# Patient Record
Sex: MALE | Race: WHITE | Employment: FULL TIME | URBAN - NONMETROPOLITAN AREA
[De-identification: names, ages, dates, MRNs, and addresses within clinical notes are randomized per-mention and may not be internally consistent; named-entity substitution may affect disease eponyms.]

---

## 2017-04-18 ENCOUNTER — OFFICE VISIT (OUTPATIENT)
Dept: FAMILY MEDICINE CLINIC | Age: 22
End: 2017-04-18

## 2017-04-18 VITALS
DIASTOLIC BLOOD PRESSURE: 78 MMHG | HEIGHT: 73 IN | WEIGHT: 293.2 LBS | BODY MASS INDEX: 38.86 KG/M2 | SYSTOLIC BLOOD PRESSURE: 138 MMHG | HEART RATE: 72 BPM

## 2017-04-18 DIAGNOSIS — S43.402A SPRAIN SHOULDER/ARM, LEFT, INITIAL ENCOUNTER: Primary | ICD-10-CM

## 2017-04-18 PROCEDURE — 99213 OFFICE O/P EST LOW 20 MIN: CPT | Performed by: FAMILY MEDICINE

## 2017-04-18 RX ORDER — NAPROXEN SODIUM 220 MG
220 TABLET ORAL PRN
COMMUNITY
End: 2017-04-18

## 2017-04-18 RX ORDER — TRAMADOL HYDROCHLORIDE 50 MG/1
50 TABLET ORAL EVERY 6 HOURS PRN
Qty: 20 TABLET | Refills: 0 | Status: SHIPPED | OUTPATIENT
Start: 2017-04-18 | End: 2017-04-28

## 2017-04-18 RX ORDER — PREDNISONE 10 MG/1
10 TABLET ORAL 2 TIMES DAILY
Qty: 14 TABLET | Refills: 0 | Status: SHIPPED | OUTPATIENT
Start: 2017-04-18 | End: 2017-04-25

## 2017-06-01 ENCOUNTER — OFFICE VISIT (OUTPATIENT)
Dept: FAMILY MEDICINE CLINIC | Age: 22
End: 2017-06-01

## 2017-06-01 VITALS
BODY MASS INDEX: 37.36 KG/M2 | WEIGHT: 283.2 LBS | SYSTOLIC BLOOD PRESSURE: 138 MMHG | HEART RATE: 86 BPM | DIASTOLIC BLOOD PRESSURE: 82 MMHG

## 2017-06-01 DIAGNOSIS — S93.401A SPRAIN OF RIGHT ANKLE, UNSPECIFIED LIGAMENT, INITIAL ENCOUNTER: Primary | ICD-10-CM

## 2017-06-01 PROCEDURE — 99213 OFFICE O/P EST LOW 20 MIN: CPT | Performed by: FAMILY MEDICINE

## 2017-10-03 ENCOUNTER — OFFICE VISIT (OUTPATIENT)
Dept: FAMILY MEDICINE CLINIC | Age: 22
End: 2017-10-03
Payer: COMMERCIAL

## 2017-10-03 VITALS
BODY MASS INDEX: 36.9 KG/M2 | HEIGHT: 73 IN | HEART RATE: 88 BPM | WEIGHT: 278.4 LBS | SYSTOLIC BLOOD PRESSURE: 138 MMHG | DIASTOLIC BLOOD PRESSURE: 80 MMHG

## 2017-10-03 DIAGNOSIS — F42.2 MIXED OBSESSIONAL THOUGHTS AND ACTS: Chronic | ICD-10-CM

## 2017-10-03 PROCEDURE — 99213 OFFICE O/P EST LOW 20 MIN: CPT | Performed by: FAMILY MEDICINE

## 2017-10-03 RX ORDER — FLUOXETINE HYDROCHLORIDE 20 MG/1
20 CAPSULE ORAL DAILY
Qty: 30 CAPSULE | Refills: 1 | Status: SHIPPED | OUTPATIENT
Start: 2017-10-03 | End: 2017-12-08 | Stop reason: SDUPTHER

## 2017-10-03 NOTE — PROGRESS NOTES
Name:  Geo Mcdowell  :    1995    Chief Complaint   Patient presents with    Anxiety     checkup       HPI:  20 minute visit to consider treatment for OCD. Has strong FHx. Has done a lot of study and review. Tries to control his over thinking. Some disability. Affect job and relationship. Concerned about people judging him. Very productive. No alcohol. Low Weed. Physical Exam:      /80 (Site: Right Arm, Position: Sitting, Cuff Size: Large Adult)  Pulse 88  Ht 6' 1\" (1.854 m)  Wt 278 lb 6.4 oz (126.3 kg)  BMI 36.73 kg/m2    Neuro is normal.  Psych shows some anxiety but no depression. Some insight. Not ready to fulling accept issue. Interactive. He can get down on himself. Assessment/Plan:      OCD    Education. Encouragement. Start Prozac. Betina Phillip was seen today for anxiety. Diagnoses and all orders for this visit:    Mixed obsessional thoughts and acts  -     FLUoxetine (PROZAC) 20 MG capsule;  Take 1 capsule by mouth daily

## 2017-10-03 NOTE — MR AVS SNAPSHOT
· Keep a record of your symptoms. Discuss your fears with a good friend or family member, or join a support group for people with similar problems. Talking to others sometimes relieves stress. · Get involved in social groups, or volunteer to help others. Being alone sometimes makes things seem worse than they are. · Get at least 30 minutes of exercise on most days of the week to relieve stress. Walking is a good choice. You also may want to do other activities, such as running, swimming, cycling, or playing tennis or team sports. Relaxation techniques  Do relaxation exercises 10 to 20 minutes a day. You can play soothing, relaxing music while you do them, if you wish. · Tell others in your house that you are going to do your relaxation exercises. Ask them not to disturb you. · Find a comfortable place, away from all distractions and noise. · Lie down on your back, or sit with your back straight. · Focus on your breathing. Make it slow and steady. · Breathe in through your nose. Breathe out through either your nose or mouth. · Breathe deeply, filling up the area between your navel and your rib cage. Breathe so that your belly goes up and down. · Do not hold your breath. · Breathe like this for 5 to 10 minutes. Notice the feeling of calmness throughout your whole body. As you continue to breathe slowly and deeply, relax by doing the following for another 5 to 10 minutes:  · Tighten and relax each muscle group in your body. You can begin at your toes and work your way up to your head. · Imagine your muscle groups relaxing and becoming heavy. · Empty your mind of all thoughts. · Let yourself relax more and more deeply. · Become aware of the state of calmness that surrounds you. · When your relaxation time is over, you can bring yourself back to alertness by moving your fingers and toes and then your hands and feet and then stretching and moving your entire body.  Sometimes people fall asleep during relaxation, but they usually wake up shortly afterward. · Always give yourself time to return to full alertness before you drive a car or do anything that might cause an accident if you are not fully alert. Never play a relaxation tape while you drive a car. When should you call for help? Call 911 anytime you think you may need emergency care. For example, call if:  · You feel you cannot stop from hurting yourself or someone else. Keep the numbers for these national suicide hotlines: 0-466-646-TALK (6-872.628.2772) and 2-502-TKMPHZZ (3-945.167.8533). If you or someone you know talks about suicide or feeling hopeless, get help right away. Watch closely for changes in your health, and be sure to contact your doctor if:  · You have anxiety or fear that affects your life. · You have symptoms of anxiety that are new or different from those you had before. Where can you learn more? Go to https://[employer]. org and sign in to your [employer] account. Enter P754 in the [employer] box to learn more about \"Anxiety Disorder: Care Instructions. \"     If you do not have an account, please click on the \"Sign Up Now\" link. Current as of: July 26, 2016  Content Version: 11.3  © 2166-1901 [employer]. Care instructions adapted under license by [facility] ([facility]). If you have questions about a medical condition or this instruction, always ask your healthcare professional. [place] [address] any warranty or liability for your use of this information. Today's Medication Changes          These changes are accurate as of: 10/3/17  3:46 PM.  If you have any questions, ask your nurse or doctor. START taking these medications           FLUoxetine 20 MG capsule   Commonly known as:  PROZAC   Instructions:   Take 1 capsule by mouth daily   Quantity:  30 capsule   Refills:  1   Started by:  Anahi Landrum MD            Where to Get Your Medications These medications were sent to [address] [place] [address], [address]  [address]     Phone:  [phone]     FLUoxetine 20 MG capsule               Your Current Medications Are              FLUoxetine (PROZAC) 20 MG capsule Take 1 capsule by mouth daily      Allergies           No Known Allergies         Additional Information        Basic Information     Date Of Birth Sex Race Ethnicity Preferred Language    1995 Male White Non-/Non  English      Problem List as of 10/3/2017  Date Reviewed: 10/30/2015                Mixed obsessional thoughts and acts (Chronic)      Your Goals as of 10/3/2017 at 3:46 PM                 Exercise    Exercise 3x per week (30 min per time)       Immunizations as of 10/3/2017     Name Date    Tdap (Boostrix, Adacel) 6/24/2014      Preventive Care        Date Due    HIV screening is recommended for all people regardless of risk factors  aged 15-65 years at least once (lifetime) who have never been HIV tested. 8/26/2008    Pneumococcal Vaccine - Pneumovax for adults aged 19-64 years with: chronic heart disease, chronic lung disease, diabetes mellitus, alcoholism, chronic liver disease, or cigarette smoking. (1 of 1 - PPSV23) 8/26/2012    Yearly Flu Vaccine (1) 9/1/2017    Tetanus Combination Vaccine (2 - Td) 6/24/2024            MyChart Signup           Our records indicate that you have declined MyChart signup.

## 2017-10-03 NOTE — PATIENT INSTRUCTIONS
Anxiety Disorder: Care Instructions  Your Care Instructions  Anxiety is a normal reaction to stress. Difficult situations can cause you to have symptoms such as sweaty palms and a nervous feeling. In an anxiety disorder, the symptoms are far more severe. Constant worry, muscle tension, trouble sleeping, nausea and diarrhea, and other symptoms can make normal daily activities difficult or impossible. These symptoms may occur for no reason, and they can affect your work, school, or social life. Medicines, counseling, and self-care can all help. Follow-up care is a key part of your treatment and safety. Be sure to make and go to all appointments, and call your doctor if you are having problems. It's also a good idea to know your test results and keep a list of the medicines you take. How can you care for yourself at home? · Take medicines exactly as directed. Call your doctor if you think you are having a problem with your medicine. · Go to your counseling sessions and follow-up appointments. · Recognize and accept your anxiety. Then, when you are in a situation that makes you anxious, say to yourself, \"This is not an emergency. I feel uncomfortable, but I am not in danger. I can keep going even if I feel anxious. \"  · Be kind to your body:  ¨ Relieve tension with exercise or a massage. ¨ Get enough rest.  ¨ Avoid alcohol, caffeine, nicotine, and illegal drugs. They can increase your anxiety level and cause sleep problems. ¨ Learn and do relaxation techniques. See below for more about these techniques. · Engage your mind. Get out and do something you enjoy. Go to a funny movie, or take a walk or hike. Plan your day. Having too much or too little to do can make you anxious. · Keep a record of your symptoms. Discuss your fears with a good friend or family member, or join a support group for people with similar problems. Talking to others sometimes relieves stress.   · Get involved in social groups, or

## 2017-12-08 ENCOUNTER — OFFICE VISIT (OUTPATIENT)
Dept: FAMILY MEDICINE CLINIC | Age: 22
End: 2017-12-08
Payer: COMMERCIAL

## 2017-12-08 VITALS
DIASTOLIC BLOOD PRESSURE: 70 MMHG | HEIGHT: 73 IN | BODY MASS INDEX: 36.71 KG/M2 | HEART RATE: 80 BPM | SYSTOLIC BLOOD PRESSURE: 108 MMHG | WEIGHT: 277 LBS

## 2017-12-08 DIAGNOSIS — F42.2 MIXED OBSESSIONAL THOUGHTS AND ACTS: Chronic | ICD-10-CM

## 2017-12-08 PROCEDURE — 99213 OFFICE O/P EST LOW 20 MIN: CPT | Performed by: FAMILY MEDICINE

## 2017-12-08 RX ORDER — FLUOXETINE HYDROCHLORIDE 20 MG/1
20 CAPSULE ORAL DAILY
Qty: 90 CAPSULE | Refills: 1 | Status: SHIPPED | OUTPATIENT
Start: 2017-12-08 | End: 2018-08-23 | Stop reason: SDUPTHER

## 2018-04-13 ENCOUNTER — OFFICE VISIT (OUTPATIENT)
Dept: FAMILY MEDICINE CLINIC | Age: 23
End: 2018-04-13
Payer: COMMERCIAL

## 2018-04-13 VITALS
DIASTOLIC BLOOD PRESSURE: 74 MMHG | SYSTOLIC BLOOD PRESSURE: 128 MMHG | WEIGHT: 278.8 LBS | HEIGHT: 73 IN | BODY MASS INDEX: 36.95 KG/M2 | HEART RATE: 68 BPM

## 2018-04-13 DIAGNOSIS — F42.2 MIXED OBSESSIONAL THOUGHTS AND ACTS: Primary | ICD-10-CM

## 2018-04-13 PROCEDURE — 99213 OFFICE O/P EST LOW 20 MIN: CPT | Performed by: FAMILY MEDICINE

## 2018-04-13 ASSESSMENT — PATIENT HEALTH QUESTIONNAIRE - PHQ9
1. LITTLE INTEREST OR PLEASURE IN DOING THINGS: 1
SUM OF ALL RESPONSES TO PHQ9 QUESTIONS 1 & 2: 2
2. FEELING DOWN, DEPRESSED OR HOPELESS: 1
SUM OF ALL RESPONSES TO PHQ QUESTIONS 1-9: 2

## 2018-08-23 ENCOUNTER — OFFICE VISIT (OUTPATIENT)
Dept: FAMILY MEDICINE CLINIC | Age: 23
End: 2018-08-23
Payer: COMMERCIAL

## 2018-08-23 VITALS
BODY MASS INDEX: 36.02 KG/M2 | SYSTOLIC BLOOD PRESSURE: 128 MMHG | DIASTOLIC BLOOD PRESSURE: 60 MMHG | WEIGHT: 273 LBS | HEART RATE: 76 BPM

## 2018-08-23 DIAGNOSIS — F42.2 MIXED OBSESSIONAL THOUGHTS AND ACTS: Chronic | ICD-10-CM

## 2018-08-23 PROCEDURE — 99213 OFFICE O/P EST LOW 20 MIN: CPT | Performed by: FAMILY MEDICINE

## 2018-08-23 RX ORDER — OMEPRAZOLE 40 MG/1
40 CAPSULE, DELAYED RELEASE ORAL DAILY
Qty: 90 CAPSULE | Refills: 1 | Status: SHIPPED | OUTPATIENT
Start: 2018-08-23 | End: 2020-12-16

## 2018-08-23 RX ORDER — FLUOXETINE HYDROCHLORIDE 20 MG/1
20 CAPSULE ORAL DAILY
Qty: 90 CAPSULE | Refills: 1 | Status: SHIPPED | OUTPATIENT
Start: 2018-08-23 | End: 2019-01-28 | Stop reason: CLARIF

## 2019-01-28 ENCOUNTER — OFFICE VISIT (OUTPATIENT)
Dept: FAMILY MEDICINE CLINIC | Age: 24
End: 2019-01-28
Payer: COMMERCIAL

## 2019-01-28 VITALS
SYSTOLIC BLOOD PRESSURE: 130 MMHG | BODY MASS INDEX: 38.67 KG/M2 | HEART RATE: 96 BPM | HEIGHT: 73 IN | DIASTOLIC BLOOD PRESSURE: 70 MMHG | WEIGHT: 291.8 LBS

## 2019-01-28 DIAGNOSIS — L30.9 DERMATITIS: Primary | ICD-10-CM

## 2019-01-28 PROCEDURE — 99213 OFFICE O/P EST LOW 20 MIN: CPT | Performed by: FAMILY MEDICINE

## 2020-12-16 ENCOUNTER — VIRTUAL VISIT (OUTPATIENT)
Dept: FAMILY MEDICINE CLINIC | Age: 25
End: 2020-12-16
Payer: COMMERCIAL

## 2020-12-16 PROCEDURE — 99213 OFFICE O/P EST LOW 20 MIN: CPT | Performed by: FAMILY MEDICINE

## 2020-12-16 PROCEDURE — G8427 DOCREV CUR MEDS BY ELIG CLIN: HCPCS | Performed by: FAMILY MEDICINE

## 2020-12-16 ASSESSMENT — ENCOUNTER SYMPTOMS
WHEEZING: 0
SORE THROAT: 0
DIARRHEA: 0
RHINORRHEA: 1
SHORTNESS OF BREATH: 0
COUGH: 1

## 2020-12-16 ASSESSMENT — PATIENT HEALTH QUESTIONNAIRE - PHQ9
SUM OF ALL RESPONSES TO PHQ9 QUESTIONS 1 & 2: 0
SUM OF ALL RESPONSES TO PHQ QUESTIONS 1-9: 0
SUM OF ALL RESPONSES TO PHQ QUESTIONS 1-9: 0
1. LITTLE INTEREST OR PLEASURE IN DOING THINGS: 0
SUM OF ALL RESPONSES TO PHQ QUESTIONS 1-9: 0
2. FEELING DOWN, DEPRESSED OR HOPELESS: 0

## 2020-12-16 NOTE — LETTER
[address] Medicine  [address] [facility]  Phone: [phone]  Fax: [phone]    Lily Riley MD        December 16, 2020     Patient: Pipe Roberts   YOB: 1995   Date of Visit: 12/16/2020       To Whom It May Concern: It is my medical opinion that Saima Castillo should not work today or tomorrow as he is being tested for covid. May not return to work until covid test is resulted and negative. If you have any questions or concerns, please don't hesitate to call.     Sincerely,          Lily Riley MD

## 2020-12-16 NOTE — PROGRESS NOTES
2020    TELEHEALTH EVALUATION -- Audio/Visual (During [facility]- public health emergency)    HPI:    Monserrat Wolfe (:  1995) has requested an audio/video evaluation for the following concern(s):    Tiredness:   Has body aches. Loss of taste  Started yesterday. Started yesterday    Roommate tested positive for covid yesterday. No other known covid exposure. Works at [employer] and son    Review of Systems   Constitutional: Positive for fatigue and fever. Negative for chills. HENT: Positive for rhinorrhea. Negative for congestion and sore throat. Respiratory: Positive for cough. Negative for shortness of breath and wheezing. Gastrointestinal: Negative for diarrhea. Neurological: Negative for dizziness and headaches. Prior to Visit Medications    Not on File       Social History     Tobacco Use    Smoking status: Current Some Day Smoker     Packs/day: 1.00    Smokeless tobacco: Never Used   Substance Use Topics    Alcohol use: Yes     Comment: occassionally    Drug use: No        No Known Allergies, History reviewed. No pertinent past medical history. PHYSICAL EXAMINATION:  [ INSTRUCTIONS:  \"[x]\" Indicates a positive item  \"[]\" Indicates a negative item  -- DELETE ALL ITEMS NOT EXAMINED]    Constitutional: [x] Appears well-developed and well-nourished [x] No apparent distress      [x] Abnormal- appears mildly sick  Mental status  [x] Alert and awake  [] Oriented to person/place/time [x]Able to follow commands      Eyes:  EOM    []  Normal  [] Abnormal-  Sclera  [x]  Normal  [] Abnormal -         Discharge [x]  None visible  [] Abnormal -    HENT:   [x] Normocephalic, atraumatic.   [] Abnormal   [x] Mouth/Throat: Mucous membranes are moist.     Neck: [x] No visualized mass     Pulmonary/Chest: [x] Respiratory effort normal.  [x] No visualized signs of difficulty breathing or respiratory distress        [] Abnormal- Skin:        [x] No significant exanthematous lesions or discoloration noted on facial skin         [] Abnormal-            Psychiatric:       [x] Normal Affect [x] No Hallucinations        [] Abnormal-     Other pertinent observable physical exam findings-     ASSESSMENT/PLAN:  1. Loss of taste  Loss of taste as well as viral symptoms. exposed to roommate who tested positive for Covid. Patient likely has Covid. will order Covid test.  Recommend Tylenol and symptomatic treatment. No antibiotic is indicated. Appropriate for outpatient treatment. He needs to isolate at home until Covid test is resulted and negative. Letter provided for work. - COVID-19 Ambulatory; Future    2. Viral illness  - COVID-19 Ambulatory; Future      Return if symptoms worsen or fail to improve. Kriss Haddad is a 30 y.o. male being evaluated by a Virtual Visit (video visit) encounter to address concerns as mentioned above. A caregiver was present when appropriate. Due to this being a TeleHealth encounter (During [facility]- public health emergency), evaluation of the following organ systems was limited: Vitals/Constitutional/EENT/Resp/CV/GI//MS/Neuro/Skin/Heme-Lymph-Imm. Pursuant to the emergency declaration under the [facility], [address] [place] [address] authority and the [employer] Act, this Virtual Visit was conducted with patient's (and/or legal guardian's) consent, to reduce the patient's risk of exposure to COVID-19 and provide necessary medical care. The patient (and/or legal guardian) has also been advised to contact this office for worsening conditions or problems, and seek emergency medical treatment and/or call 911 if deemed necessary.      Patient identification was verified at the start of the visit: Yes    Total time spent on this encounter: Not billed by time Services were provided through a video synchronous discussion virtually to substitute for in-person clinic visit. Patient and provider were located at their individual homes. --Nasreen Jacobo MD on 12/16/2020 at 1:10 PM    An electronic signature was used to authenticate this note.

## 2020-12-17 LAB — SARS-COV-2: NOT DETECTED

## 2021-09-03 ENCOUNTER — OFFICE VISIT (OUTPATIENT)
Dept: FAMILY MEDICINE CLINIC | Age: 26
End: 2021-09-03
Payer: COMMERCIAL

## 2021-09-03 VITALS
TEMPERATURE: 96.6 F | OXYGEN SATURATION: 99 % | SYSTOLIC BLOOD PRESSURE: 128 MMHG | DIASTOLIC BLOOD PRESSURE: 82 MMHG | HEART RATE: 88 BPM | WEIGHT: 284.2 LBS | BODY MASS INDEX: 37.67 KG/M2 | RESPIRATION RATE: 16 BRPM | HEIGHT: 73 IN

## 2021-09-03 DIAGNOSIS — N48.9 LESION OF PENIS: Primary | ICD-10-CM

## 2021-09-03 DIAGNOSIS — Z11.3 ROUTINE SCREENING FOR STI (SEXUALLY TRANSMITTED INFECTION): ICD-10-CM

## 2021-09-03 PROCEDURE — 99213 OFFICE O/P EST LOW 20 MIN: CPT | Performed by: FAMILY MEDICINE

## 2021-09-03 PROCEDURE — G8417 CALC BMI ABV UP PARAM F/U: HCPCS | Performed by: FAMILY MEDICINE

## 2021-09-03 PROCEDURE — G8427 DOCREV CUR MEDS BY ELIG CLIN: HCPCS | Performed by: FAMILY MEDICINE

## 2021-09-03 PROCEDURE — 4004F PT TOBACCO SCREEN RCVD TLK: CPT | Performed by: FAMILY MEDICINE

## 2021-09-03 RX ORDER — DOXYCYCLINE HYCLATE 100 MG
100 TABLET ORAL 2 TIMES DAILY
Qty: 42 TABLET | Refills: 0 | Status: SHIPPED | OUTPATIENT
Start: 2021-09-03 | End: 2021-09-24

## 2021-09-03 SDOH — ECONOMIC STABILITY: FOOD INSECURITY: WITHIN THE PAST 12 MONTHS, YOU WORRIED THAT YOUR FOOD WOULD RUN OUT BEFORE YOU GOT MONEY TO BUY MORE.: NEVER TRUE

## 2021-09-03 SDOH — ECONOMIC STABILITY: FOOD INSECURITY: WITHIN THE PAST 12 MONTHS, THE FOOD YOU BOUGHT JUST DIDN'T LAST AND YOU DIDN'T HAVE MONEY TO GET MORE.: NEVER TRUE

## 2021-09-03 ASSESSMENT — PATIENT HEALTH QUESTIONNAIRE - PHQ9
2. FEELING DOWN, DEPRESSED OR HOPELESS: 0
SUM OF ALL RESPONSES TO PHQ QUESTIONS 1-9: 0
SUM OF ALL RESPONSES TO PHQ9 QUESTIONS 1 & 2: 0
SUM OF ALL RESPONSES TO PHQ QUESTIONS 1-9: 0
SUM OF ALL RESPONSES TO PHQ QUESTIONS 1-9: 0
1. LITTLE INTEREST OR PLEASURE IN DOING THINGS: 0

## 2021-09-03 ASSESSMENT — SOCIAL DETERMINANTS OF HEALTH (SDOH): HOW HARD IS IT FOR YOU TO PAY FOR THE VERY BASICS LIKE FOOD, HOUSING, MEDICAL CARE, AND HEATING?: PATIENT DECLINED

## 2021-09-03 NOTE — PROGRESS NOTES
[facility]  [facility] MEDICINE  [address] [facility]  Dept: [phone]  Dept Fax: [phone]  Loc: [phone]  PROGRESS NOTE      Visit Date: 9/3/2021    Chata Smith is a 27 y.o. male who presents today for:  Chief Complaint   Patient presents with    Personal Problem       Subjective:  HPI     Bump at base of penis. Had sexual encounter a couple weeks ago with girlfriend. No condom used. He states he was shaving and cut himself in this area. Improved yesterday and today but was growing before than. He put antibiotic ointment on it when it felt bigger. About 2 sexual partners in past year. Review of Systems   Genitourinary: Positive for genital sores. Negative for dysuria, frequency, penile pain, scrotal swelling, testicular pain and urgency. Patient Active Problem List   Diagnosis    Mixed obsessional thoughts and acts     No past medical history on file. Past Surgical History:   Procedure Laterality Date    TOOTH EXTRACTION       Family History   Problem Relation Age of Onset    Kidney Disease Father     Mental Illness Mother     Cancer Maternal Grandmother     High Blood Pressure Maternal Grandmother     Cancer Maternal Grandfather     Asthma Neg Hx     Diabetes Neg Hx     Heart Disease Neg Hx     Stroke Neg Hx      Social History     Tobacco Use    Smoking status: Current Some Day Smoker     Packs/day: 1.00    Smokeless tobacco: Never Used   Substance Use Topics    Alcohol use: Yes     Comment: occassionally      No current outpatient medications on file. No current facility-administered medications for this visit.      No Known Allergies  Health Maintenance   Topic Date Due    Hepatitis C screen  Never done    Varicella vaccine (1 of 2 - 2-dose childhood series) Never done    Pneumococcal 0-64 years Vaccine (1 of 2 - PPSV23) Never done    COVID-19 Vaccine (1) Never done    HIV screen  Never done  Flu vaccine (1) Never done    DTaP/Tdap/Td vaccine (2 - Td or Tdap) 06/24/2024    Hepatitis A vaccine  Aged Out    Hepatitis B vaccine  Aged Out    Hib vaccine  Aged Out    Meningococcal (ACWY) vaccine  Aged Out       Objective:  /82   Pulse 88   Temp 96.6 °F (35.9 °C)   Resp 16   Ht 6' 1\" (1.854 m)   Wt 284 lb 3.2 oz (128.9 kg)   SpO2 99%   BMI 37.50 kg/m²   Physical Exam  Vitals reviewed. Constitutional:       General: He is not in acute distress. Appearance: He is not ill-appearing. Genitourinary:     Penis: Circumcised. Lesions present. No erythema, discharge or swelling. Comments: Posterior base of penis with solitary 3 mm oval shaped skin bump without ulcer or drainage. Mildly tender. No vesicles  Neurological:      Mental Status: He is alert. Psychiatric:         Mood and Affect: Mood is anxious. Speech: Speech normal.         Behavior: Behavior normal.         Impression/Plan:  1. Lesion of penis  New problem. Unclear etiology. Possible chancre but most likely is  bacterial pustule caused by shaving that is healing. Will treat with doxy per patient preference. Test for STI as below for screening. Wound is no open to culture and no drainage to culture. - doxycycline hyclate (VIBRA-TABS) 100 MG tablet; Take 1 tablet by mouth 2 times daily for 21 days  Dispense: 42 tablet; Refill: 0    2. Routine screening for STI (sexually transmitted infection)  - RPR; Future  - C. Trachomatis / N. Gonorrhoeae, DNA Probe; Future      They voiced understanding. All questions answered. They agreed with treatment plan. See patient instructions for any educational materials that may have been given. Discussed use, benefit, and side effects of prescribed medications. Reviewed health maintenance.     (Please note that portions of this note may have been completed with a voice recognition program.  Efforts were made to edit the dictation but occasionally words are mis-transcribed.)    Return if symptoms worsen or fail to improve.       Electronically signed by Lakisha Ward MD on 9/3/2021 at 9:44 AM

## 2021-09-03 NOTE — PATIENT INSTRUCTIONS
Likely lesion is related to infection from shaving  Take doxycycline twice daily for 21 days  Get RPR (Syphylis blood test)  -unable to determine if it is chancroid or granuloma inguinale

## 2021-09-08 ENCOUNTER — NURSE ONLY (OUTPATIENT)
Dept: LAB | Age: 26
End: 2021-09-08

## 2021-09-08 DIAGNOSIS — Z11.3 ROUTINE SCREENING FOR STI (SEXUALLY TRANSMITTED INFECTION): ICD-10-CM

## 2021-09-08 LAB — RPR: NONREACTIVE

## 2021-09-09 ENCOUNTER — TELEPHONE (OUTPATIENT)
Dept: FAMILY MEDICINE CLINIC | Age: 26
End: 2021-09-09

## 2021-09-09 NOTE — TELEPHONE ENCOUNTER
----- Message from Mary Vazquez MD sent at 9/9/2021  5:14 AM EDT -----  Negative RPR (Syphilis) test.  Please advise patient.   Mary Vazquez MD

## 2021-09-11 LAB
CHLAMYDIA TRACHOMATIS AMPLIFIED DET: NEGATIVE
NEISSERIA GONORRHOEAE BY AMP: NEGATIVE
SPECIMEN SOURCE: NORMAL

## 2021-09-13 ENCOUNTER — TELEPHONE (OUTPATIENT)
Dept: FAMILY MEDICINE CLINIC | Age: 26
End: 2021-09-13

## 2021-09-13 NOTE — TELEPHONE ENCOUNTER
Doxycycline may not treat all UTIs. Recommend appointment in office for further evaluation and possible UA. May need to see Callahan. Please advise patient.   Ingrid Dior MD

## 2021-09-13 NOTE — TELEPHONE ENCOUNTER
Boubacar Connors calls with C/O lower right side back pain , tired,he thinks he may have a UTI / Kidney infection. He is being treated for a STD now with Doxycycline and wants to know if this would cure all or not.

## 2022-01-28 ENCOUNTER — TELEPHONE (OUTPATIENT)
Dept: FAMILY MEDICINE CLINIC | Age: 27
End: 2022-01-28

## 2022-01-28 NOTE — TELEPHONE ENCOUNTER
Teresita Paredes is COVID positive , he is requesting paperwork be filled out for short term disability , I have paperwork Does he need an appointment ?

## 2022-01-28 NOTE — TELEPHONE ENCOUNTER
Will need visit to document covid infection and dates needed for short term disability. Please advise patient.   Catracho Ramirez MD

## 2022-02-01 ENCOUNTER — VIRTUAL VISIT (OUTPATIENT)
Dept: FAMILY MEDICINE CLINIC | Age: 27
End: 2022-02-01
Payer: COMMERCIAL

## 2022-02-01 DIAGNOSIS — U07.1 COVID-19 VIRUS INFECTION: Primary | ICD-10-CM

## 2022-02-01 PROCEDURE — 99212 OFFICE O/P EST SF 10 MIN: CPT | Performed by: FAMILY MEDICINE

## 2022-02-01 PROCEDURE — G8427 DOCREV CUR MEDS BY ELIG CLIN: HCPCS | Performed by: FAMILY MEDICINE

## 2022-02-01 ASSESSMENT — ENCOUNTER SYMPTOMS
SHORTNESS OF BREATH: 0
COUGH: 0

## 2022-02-01 NOTE — PROGRESS NOTES
2022    TELEHEALTH EVALUATION -- Audio/Visual (During [facility] public health emergency)    HPI:    Del Boeck (:  1995) has requested an audio/video evaluation for the following concern(s):    covid infection. Tested positive for covid on 22. Symptoms started on 22. Missed work -. Returned to work yesterday. Feels weak and tired    Requests short term disability paperwork completed. Review of Systems   Constitutional: Negative for chills and fever. Respiratory: Negative for cough and shortness of breath. Prior to Visit Medications    Not on File       Social History     Tobacco Use    Smoking status: Current Some Day Smoker     Packs/day: 1.00    Smokeless tobacco: Never Used   Substance Use Topics    Alcohol use: Yes     Comment: occassionally    Drug use: No        No Known Allergies, History reviewed. No pertinent past medical history. PHYSICAL EXAMINATION:  [ INSTRUCTIONS:  \"[x]\" Indicates a positive item  \"[]\" Indicates a negative item  -- DELETE ALL ITEMS NOT EXAMINED]  Constitutional: [x] Appears well-developed and well-nourished [x] No apparent distress      [] Abnormal-   Mental status  [x] Alert and awake  [] Oriented to person/place/time [x]Able to follow commands      Pulmonary/Chest: [x] Respiratory effort normal.  [x] No visualized signs of difficulty breathing or respiratory distress        [] Abnormal-          Skin:        [x] No significant exanthematous lesions or discoloration noted on facial skin         [] Abnormal-            Psychiatric:       [x] Normal Affect [x] No Hallucinations        [] Abnormal-     Other pertinent observable physical exam findings-     He was smoking during the visit. ASSESSMENT/PLAN:  1. COVID-19 virus infection  Status post Covid infection. Improving. Still has some tiredness. Needs FMLA paperwork completed per dates listed in HPI. No meds indicated.        Return if symptoms worsen or fail to alberto Tate was evaluated through a synchronous (real-time) audio-video encounter. The patient (or guardian if applicable) is aware that this is a billable service, which includes applicable co-pays. This Virtual Visit was conducted with patient's (and/or legal guardian's) consent. The visit was conducted pursuant to the emergency declaration under the [address] [place] [address] and the [employer] General Act. Patient identification was verified, and a caregiver was present when appropriate. The patient was located at home in a state where the provider was licensed to provide care. Total time spent on this encounter: Not billed by time    --Dinesh Acosta MD on 2/1/2022 at 1:11 PM    An electronic signature was used to authenticate this note.

## 2023-10-12 ENCOUNTER — OFFICE VISIT (OUTPATIENT)
Dept: FAMILY MEDICINE CLINIC | Age: 28
End: 2023-10-12

## 2023-10-12 VITALS
OXYGEN SATURATION: 97 % | WEIGHT: 309 LBS | SYSTOLIC BLOOD PRESSURE: 126 MMHG | BODY MASS INDEX: 40.95 KG/M2 | TEMPERATURE: 97.6 F | RESPIRATION RATE: 16 BRPM | HEIGHT: 73 IN | DIASTOLIC BLOOD PRESSURE: 82 MMHG | HEART RATE: 84 BPM

## 2023-10-12 DIAGNOSIS — H60.502 ACUTE OTITIS EXTERNA OF LEFT EAR, UNSPECIFIED TYPE: Primary | ICD-10-CM

## 2023-10-12 PROCEDURE — 99213 OFFICE O/P EST LOW 20 MIN: CPT | Performed by: NURSE PRACTITIONER

## 2023-10-12 SDOH — ECONOMIC STABILITY: HOUSING INSECURITY
IN THE LAST 12 MONTHS, WAS THERE A TIME WHEN YOU DID NOT HAVE A STEADY PLACE TO SLEEP OR SLEPT IN A SHELTER (INCLUDING NOW)?: NO

## 2023-10-12 SDOH — ECONOMIC STABILITY: FOOD INSECURITY: WITHIN THE PAST 12 MONTHS, THE FOOD YOU BOUGHT JUST DIDN'T LAST AND YOU DIDN'T HAVE MONEY TO GET MORE.: NEVER TRUE

## 2023-10-12 SDOH — ECONOMIC STABILITY: INCOME INSECURITY: HOW HARD IS IT FOR YOU TO PAY FOR THE VERY BASICS LIKE FOOD, HOUSING, MEDICAL CARE, AND HEATING?: NOT HARD AT ALL

## 2023-10-12 SDOH — ECONOMIC STABILITY: FOOD INSECURITY: WITHIN THE PAST 12 MONTHS, YOU WORRIED THAT YOUR FOOD WOULD RUN OUT BEFORE YOU GOT MONEY TO BUY MORE.: NEVER TRUE

## 2023-10-12 ASSESSMENT — ENCOUNTER SYMPTOMS
DIARRHEA: 0
SORE THROAT: 0
EYE PAIN: 0
RHINORRHEA: 0
VOMITING: 0
EYE REDNESS: 0
COUGH: 0
ABDOMINAL PAIN: 0
EYE ITCHING: 0

## 2023-10-12 ASSESSMENT — PATIENT HEALTH QUESTIONNAIRE - PHQ9
SUM OF ALL RESPONSES TO PHQ QUESTIONS 1-9: 0
1. LITTLE INTEREST OR PLEASURE IN DOING THINGS: 0
2. FEELING DOWN, DEPRESSED OR HOPELESS: 0
SUM OF ALL RESPONSES TO PHQ9 QUESTIONS 1 & 2: 0
SUM OF ALL RESPONSES TO PHQ QUESTIONS 1-9: 0

## 2023-10-12 NOTE — PROGRESS NOTES
[facility]  [address] [facility]  Dept: [phone]  Loc: [address] (:  1995) is a 25 y.o. male, here for evaluation of the following chief complaint(s):  Ear Problem (Left ear feeling \"hollow. \" Pt feels weird. Started having issues when the weather got cold. Pt denies room spinning dizziness but doesn't feel as sharp. No pain, discharge, hearing loss, or swelling to ear. Pt slept on heating pad and made ear feel a lot better. )      ASSESSMENT/PLAN:  1. Acute otitis externa of left ear, unspecified type  -     neomycin-polymyxin-hydrocortisone (CORTISPORIN) 3.5-13876-8 otic solution; Place 4 drops into the left ear 3 times daily for 10 days Instill into left Ear, Disp-10 mL, R-0Normal    Will start drops for OE. Patient will update me on Monday if he is not feeling better and will consider next steps. Return for Regular follow up as scheduled and as needed. SUBJECTIVE/OBJECTIVE:  Ear Fullness   There is pain in the left ear. This is a new problem. The current episode started 1 to 4 weeks ago. The problem occurs every few hours. The problem has been waxing and waning. There has been no fever. The pain is at a severity of 0/10. Pertinent negatives include no abdominal pain, coughing, diarrhea, ear discharge, headaches, hearing loss, neck pain, rash, rhinorrhea, sore throat or vomiting. He has tried nothing for the symptoms. Review of Systems   Constitutional:  Negative for chills and fever. HENT:  Negative for ear discharge, hearing loss, rhinorrhea and sore throat. Eyes:  Negative for pain, redness and itching. Respiratory:  Negative for cough. Gastrointestinal:  Negative for abdominal pain, diarrhea and vomiting. Musculoskeletal:  Negative for neck pain. Skin:  Negative for rash. Neurological:  Negative for dizziness and headaches.        Physical Exam  Constitutional:

## 2024-06-10 ENCOUNTER — OFFICE VISIT (OUTPATIENT)
Dept: FAMILY MEDICINE CLINIC | Age: 29
End: 2024-06-10

## 2024-06-10 VITALS
WEIGHT: 315 LBS | BODY MASS INDEX: 41.75 KG/M2 | HEART RATE: 82 BPM | TEMPERATURE: 97.7 F | SYSTOLIC BLOOD PRESSURE: 158 MMHG | RESPIRATION RATE: 22 BRPM | HEIGHT: 73 IN | OXYGEN SATURATION: 97 % | DIASTOLIC BLOOD PRESSURE: 102 MMHG

## 2024-06-10 DIAGNOSIS — K21.9 GASTROESOPHAGEAL REFLUX DISEASE, UNSPECIFIED WHETHER ESOPHAGITIS PRESENT: ICD-10-CM

## 2024-06-10 DIAGNOSIS — R09.A2 GLOBUS SENSATION: Primary | ICD-10-CM

## 2024-06-10 DIAGNOSIS — J02.9 SORE THROAT: ICD-10-CM

## 2024-06-10 PROBLEM — M77.12 LEFT LATERAL EPICONDYLITIS: Status: ACTIVE | Noted: 2024-06-10

## 2024-06-10 LAB — STREPTOCOCCUS A RNA: NEGATIVE

## 2024-06-10 PROCEDURE — 99214 OFFICE O/P EST MOD 30 MIN: CPT | Performed by: NURSE PRACTITIONER

## 2024-06-10 PROCEDURE — 87651 STREP A DNA AMP PROBE: CPT | Performed by: NURSE PRACTITIONER

## 2024-06-10 RX ORDER — PANTOPRAZOLE SODIUM 40 MG/1
40 TABLET, DELAYED RELEASE ORAL
Qty: 90 TABLET | Refills: 1 | Status: SHIPPED | OUTPATIENT
Start: 2024-06-10

## 2024-06-10 ASSESSMENT — PATIENT HEALTH QUESTIONNAIRE - PHQ9
SUM OF ALL RESPONSES TO PHQ9 QUESTIONS 1 & 2: 0
SUM OF ALL RESPONSES TO PHQ QUESTIONS 1-9: 0
SUM OF ALL RESPONSES TO PHQ QUESTIONS 1-9: 0
1. LITTLE INTEREST OR PLEASURE IN DOING THINGS: NOT AT ALL
SUM OF ALL RESPONSES TO PHQ QUESTIONS 1-9: 0
SUM OF ALL RESPONSES TO PHQ QUESTIONS 1-9: 0
2. FEELING DOWN, DEPRESSED OR HOPELESS: NOT AT ALL

## 2024-06-10 ASSESSMENT — ENCOUNTER SYMPTOMS
ABDOMINAL PAIN: 0
CHEST TIGHTNESS: 0
COUGH: 1
EYE PAIN: 0
VOMITING: 0
SORE THROAT: 0
SHORTNESS OF BREATH: 0
NAUSEA: 0

## 2024-06-10 NOTE — PROGRESS NOTES
[facility] PROFESSIONAL [facility] MEDICINE  [address]  [facility] [address]  Dept: [phone]  Loc: [phone]     Gus Ratliff (:  1995) is a 28 y.o. male, here for evaluation of the following chief complaint(s):  Pharyngitis (Somewhat sore but more of a feeling of something in his throat. Quit smoking last Wednesday. He is coughing up some phlegm since he quit. )      ASSESSMENT/PLAN:  1. Globus sensation  -     AFL (Epic) - Dario Morrissey MD, Gastroenterology, [place]  2. Gastroesophageal reflux disease, unspecified whether esophagitis present  -     pantoprazole (PROTONIX) 40 MG tablet; Take 1 tablet by mouth every morning (before breakfast), Disp-90 tablet, R-1Normal  -     AFL (Epic) - Dario Morrissey [facility], [facility]enterology, [facility]  3. Sore throat  -     POCT Rapid Strep A DNA  -     AFL (Epic) - Dario Morrissey MD, Gastroenterology, [place]    Will start pantoprazole and refer to GI for EGD.  Patient will call with any questions or concerns.    Return for Regular follow up as scheduled and as needed.    SUBJECTIVE/OBJECTIVE:  Patient reports globus sensation. States has been present for about 1 week. Does not choke. No issues swallowing food or drink. Does have long history of heartburn. Started omeprazole for the heart burn. Does occasionally have runny nose in the AM but does not feel like he has post nasal drainage. No sensation of throat closing or breathing issues.  Patient states he did stop smoking about the same time he noticed the sensation in his throat. States it goes away when eating or drinking but returns when he stops.        Review of Systems   Constitutional:  Negative for chills and fever.   HENT:  Negative for congestion and sore throat.    Eyes:  Negative for pain and visual disturbance.   Respiratory:  Positive for cough. Negative for chest tightness and shortness of breath.    Cardiovascular:  Negative for chest pain and palpitations.

## 2024-09-12 ENCOUNTER — TELEPHONE (OUTPATIENT)
Dept: ENT CLINIC | Age: 29
End: 2024-09-12

## 2024-10-07 ENCOUNTER — OFFICE VISIT (OUTPATIENT)
Dept: ENT CLINIC | Age: 29
End: 2024-10-07

## 2024-10-07 VITALS
TEMPERATURE: 97.3 F | DIASTOLIC BLOOD PRESSURE: 78 MMHG | OXYGEN SATURATION: 97 % | SYSTOLIC BLOOD PRESSURE: 138 MMHG | HEIGHT: 73 IN | HEART RATE: 81 BPM | WEIGHT: 315 LBS | BODY MASS INDEX: 41.75 KG/M2 | RESPIRATION RATE: 18 BRPM

## 2024-10-07 DIAGNOSIS — R09.89 ABNORMAL FINDINGS ON LARYNGOSCOPY: Primary | ICD-10-CM

## 2024-10-07 DIAGNOSIS — K21.9 GASTROESOPHAGEAL REFLUX DISEASE, UNSPECIFIED WHETHER ESOPHAGITIS PRESENT: ICD-10-CM

## 2024-10-07 DIAGNOSIS — R09.A2 GLOBUS SENSATION: ICD-10-CM

## 2024-10-07 DIAGNOSIS — F17.200 CURRENT SMOKER: ICD-10-CM

## 2024-10-07 DIAGNOSIS — Z01.818 PREOP TESTING: Primary | ICD-10-CM

## 2024-10-07 DIAGNOSIS — R49.0 HOARSENESS OF VOICE: ICD-10-CM

## 2024-10-07 PROCEDURE — 31575 DIAGNOSTIC LARYNGOSCOPY: CPT | Performed by: REGISTERED NURSE

## 2024-10-07 PROCEDURE — 99203 OFFICE O/P NEW LOW 30 MIN: CPT | Performed by: REGISTERED NURSE

## 2024-10-07 NOTE — PROGRESS NOTES
The scope was advanced into the nasopharynx through bilaterally patent choanae, and the adenoids are partially obstructing.  The oropharynx and hypopharynx are normal, without masses or lesions, foreign bodies or drainage.  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 This lesion is partially mobile with breathing; non-obstructive and no evidence of recent bleeding.  Bilateral vocal cords are relatively mobile despite mass.    SUMMARY OF FINDINGS: Vocal cord mass                    Data:    All of the past medical history, past surgical history, family history, social history, allergies and current medications were reviewed. This includes notes from referring provider(s) and associated labs/imaging.    Assessment/Plan:      ICD-10-CM    1. Abnormal findings on laryngoscopy  R09.89 SCHEDULE SURGERY      2. Globus sensation  R09.A2 SCHEDULE SURGERY      3. Hoarseness of voice  R49.0 SCHEDULE SURGERY      4. Current smoker  F17.200 SCHEDULE SURGERY      5. Gastroesophageal reflux disease, unspecified whether esophagitis present  K21.9 SCHEDULE SURGERY           Abnormal findings on laryngoscopy consistent with large pedunculated vocal cord mass. Recommend proceeding with laryngoscopy with biopsy in the main OR. Reviewed with patient and mother at appointment that this is very important to determine appropriate next steps in plan of care. Risks of procedure reviewed with patient including post operative pain, bleeding, or potential infectious process and he wishes to proceed. Patient brought to surgery scheduler to arrange a date upcoming. Reviewed consideration for obtaining CT neck with contrast to evaluate surrounding tissues/lymphadenopathy and patient notes being self-pay and chose to decline due to financial strain. Recommended continuing with PPI in the morning and advised starting Pepcid 20 mg at night-time which patient wishes to obtain OTC.

## 2024-10-07 NOTE — PATIENT INSTRUCTIONS
Continue with protonix in the AM before eating anything  Add Pepcid 20 mg nightly before bed  Avoid the \"5 C's\" - Caffeine, Carbonation, Citrus, Chocolate and Cocktails (alcohol, including beer).   Do not eat within 2-3 hours of lying down for the night.  Elevate head of bed.      'direct access' labwork-thyroid function testing: TSH with reflex to T4

## 2024-10-10 ENCOUNTER — TELEPHONE (OUTPATIENT)
Dept: ENT CLINIC | Age: 29
End: 2024-10-10

## 2024-10-10 ENCOUNTER — HOSPITAL ENCOUNTER (OUTPATIENT)
Dept: GENERAL RADIOLOGY | Age: 29
Discharge: HOME | End: 2024-10-10

## 2024-10-10 ENCOUNTER — HOSPITAL ENCOUNTER (OUTPATIENT)
Age: 29
Discharge: HOME | End: 2024-10-10

## 2024-10-10 DIAGNOSIS — Z01.818 PREOP TESTING: ICD-10-CM

## 2024-10-10 PROCEDURE — 71046 X-RAY EXAM CHEST 2 VIEWS: CPT

## 2024-10-10 NOTE — TELEPHONE ENCOUNTER
Called patient to see if he had cxr completed. He did not. He said he was trying to get insurance first. I advised him to let me know asap because it will need a pre cert before procedure.  He voiced understanding.

## 2024-10-15 ENCOUNTER — TELEPHONE (OUTPATIENT)
Dept: ENT CLINIC | Age: 29
End: 2024-10-15

## 2024-10-15 NOTE — TELEPHONE ENCOUNTER
I called patient and reviewed his concerns; he is in the process of obtaining insurance and will not have coverage until November 1st. Unable to pay out of pocket for biopsy originally scheduled for tomorrow.    Patient wishes to proceed; just after coverage in place.     No other questions; he voices planning to adjust time frame for Pepcid.     He understands to call in interim with changes or new concerns.

## 2024-10-16 ENCOUNTER — PREP FOR PROCEDURE (OUTPATIENT)
Dept: ENT CLINIC | Age: 29
End: 2024-10-16

## 2024-10-16 DIAGNOSIS — R09.89 ABNORMAL FINDINGS ON LARYNGOSCOPY: ICD-10-CM

## 2024-10-16 DIAGNOSIS — R09.A2 GLOBUS SENSATION: ICD-10-CM

## 2024-10-16 DIAGNOSIS — F17.200 CURRENT SMOKER: ICD-10-CM

## 2024-10-16 DIAGNOSIS — R49.0 HOARSENESS: ICD-10-CM

## 2024-10-16 PROBLEM — K21.9 GASTROESOPHAGEAL REFLUX DISEASE: Status: ACTIVE | Noted: 2024-10-16

## 2024-10-24 ENCOUNTER — TELEPHONE (OUTPATIENT)
Dept: ENT CLINIC | Age: 29
End: 2024-10-24

## 2024-10-24 NOTE — TELEPHONE ENCOUNTER
Lvm to cb. Patient stated he was going to be signing up for marketplace insurance when I last spoke with him.  Need the info for PA.

## 2024-10-24 NOTE — TELEPHONE ENCOUNTER
Patient returned call. He has signed up for [employer] which should start 11/01.  He had an M ID# [id number] and ph [phone].  I'm not sure if this will be the billing ID # or not.

## 2024-10-24 NOTE — TELEPHONE ENCOUNTER
Patient returned call. He has not purchased insurance at this time. He is aware I need the info as soon as possible.

## 2024-10-28 ENCOUNTER — PREP FOR PROCEDURE (OUTPATIENT)
Dept: ENT CLINIC | Age: 29
End: 2024-10-28

## 2024-10-28 DIAGNOSIS — D38.0 NEOPLASM OF UNCERTAIN BEHAVIOR OF VOCAL CORD: ICD-10-CM

## 2024-10-28 DIAGNOSIS — F17.200 CURRENT SMOKER: ICD-10-CM

## 2024-10-28 DIAGNOSIS — R09.89 ABNORMAL FINDINGS ON LARYNGOSCOPY: Primary | ICD-10-CM

## 2024-10-28 DIAGNOSIS — R49.0 HOARSENESS: ICD-10-CM

## 2024-11-03 PROBLEM — D38.0 NEOPLASM OF UNCERTAIN BEHAVIOR OF VOCAL CORD: Status: ACTIVE | Noted: 2024-11-03

## 2024-11-03 RX ORDER — SODIUM CHLORIDE 9 MG/ML
INJECTION, SOLUTION INTRAVENOUS PRN
Status: CANCELLED | OUTPATIENT
Start: 2024-11-03

## 2024-11-03 RX ORDER — SODIUM CHLORIDE 0.9 % (FLUSH) 0.9 %
5-40 SYRINGE (ML) INJECTION EVERY 12 HOURS SCHEDULED
Status: CANCELLED | OUTPATIENT
Start: 2024-11-03

## 2024-11-03 RX ORDER — SODIUM CHLORIDE 0.9 % (FLUSH) 0.9 %
5-40 SYRINGE (ML) INJECTION PRN
Status: CANCELLED | OUTPATIENT
Start: 2024-11-03

## 2024-11-04 ENCOUNTER — HOSPITAL ENCOUNTER (OUTPATIENT)
Age: 29
Setting detail: OUTPATIENT SURGERY
Discharge: HOME | End: 2024-11-04
Attending: OTOLARYNGOLOGY | Admitting: OTOLARYNGOLOGY
Payer: COMMERCIAL

## 2024-11-04 ENCOUNTER — ANESTHESIA (OUTPATIENT)
Dept: OPERATING ROOM | Age: 29
End: 2024-11-04
Payer: COMMERCIAL

## 2024-11-04 ENCOUNTER — ANESTHESIA EVENT (OUTPATIENT)
Dept: OPERATING ROOM | Age: 29
End: 2024-11-04
Payer: COMMERCIAL

## 2024-11-04 VITALS
OXYGEN SATURATION: 98 % | RESPIRATION RATE: 16 BRPM | BODY MASS INDEX: 40.43 KG/M2 | SYSTOLIC BLOOD PRESSURE: 145 MMHG | DIASTOLIC BLOOD PRESSURE: 78 MMHG | HEART RATE: 80 BPM | TEMPERATURE: 97 F | HEIGHT: 74 IN | WEIGHT: 315 LBS

## 2024-11-04 DIAGNOSIS — R49.0 HOARSENESS: ICD-10-CM

## 2024-11-04 DIAGNOSIS — F17.200 CURRENT SMOKER: ICD-10-CM

## 2024-11-04 DIAGNOSIS — D38.0 NEOPLASM OF UNCERTAIN BEHAVIOR OF VOCAL CORD: Primary | ICD-10-CM

## 2024-11-04 DIAGNOSIS — R09.A2 GLOBUS SENSATION: ICD-10-CM

## 2024-11-04 DIAGNOSIS — R09.89 ABNORMAL FINDINGS ON LARYNGOSCOPY: ICD-10-CM

## 2024-11-04 DIAGNOSIS — K21.9 GASTROESOPHAGEAL REFLUX DISEASE: ICD-10-CM

## 2024-11-04 PROCEDURE — 7100000011 HC PHASE II RECOVERY - ADDTL 15 MIN: Performed by: OTOLARYNGOLOGY

## 2024-11-04 PROCEDURE — 7100000000 HC PACU RECOVERY - FIRST 15 MIN: Performed by: OTOLARYNGOLOGY

## 2024-11-04 PROCEDURE — 3600000014 HC SURGERY LEVEL 4 ADDTL 15MIN: Performed by: OTOLARYNGOLOGY

## 2024-11-04 PROCEDURE — 88305 TISSUE EXAM BY PATHOLOGIST: CPT

## 2024-11-04 PROCEDURE — 2709999900 HC NON-CHARGEABLE SUPPLY: Performed by: OTOLARYNGOLOGY

## 2024-11-04 PROCEDURE — 3600000004 HC SURGERY LEVEL 4 BASE: Performed by: OTOLARYNGOLOGY

## 2024-11-04 PROCEDURE — 2500000003 HC RX 250 WO HCPCS: Performed by: NURSE ANESTHETIST, CERTIFIED REGISTERED

## 2024-11-04 PROCEDURE — 7100000001 HC PACU RECOVERY - ADDTL 15 MIN: Performed by: OTOLARYNGOLOGY

## 2024-11-04 PROCEDURE — 6360000002 HC RX W HCPCS: Performed by: NURSE ANESTHETIST, CERTIFIED REGISTERED

## 2024-11-04 PROCEDURE — 31536 LARYNGOSCOPY W/BX & OP SCOPE: CPT | Performed by: OTOLARYNGOLOGY

## 2024-11-04 PROCEDURE — 3700000001 HC ADD 15 MINUTES (ANESTHESIA): Performed by: OTOLARYNGOLOGY

## 2024-11-04 PROCEDURE — 7100000010 HC PHASE II RECOVERY - FIRST 15 MIN: Performed by: OTOLARYNGOLOGY

## 2024-11-04 PROCEDURE — 2580000003 HC RX 258: Performed by: OTOLARYNGOLOGY

## 2024-11-04 PROCEDURE — 3700000000 HC ANESTHESIA ATTENDED CARE: Performed by: OTOLARYNGOLOGY

## 2024-11-04 RX ORDER — HYDROCODONE BITARTRATE AND ACETAMINOPHEN 7.5; 325 MG/1; MG/1
1 TABLET ORAL EVERY 6 HOURS PRN
Qty: 20 TABLET | Refills: 0 | Status: SHIPPED | OUTPATIENT
Start: 2024-11-04 | End: 2024-11-09

## 2024-11-04 RX ORDER — ESMOLOL HYDROCHLORIDE 10 MG/ML
INJECTION INTRAVENOUS
Status: DISCONTINUED | OUTPATIENT
Start: 2024-11-04 | End: 2024-11-04 | Stop reason: SDUPTHER

## 2024-11-04 RX ORDER — DEXAMETHASONE SODIUM PHOSPHATE 10 MG/ML
INJECTION, EMULSION INTRAMUSCULAR; INTRAVENOUS
Status: DISCONTINUED | OUTPATIENT
Start: 2024-11-04 | End: 2024-11-04 | Stop reason: SDUPTHER

## 2024-11-04 RX ORDER — PROPOFOL 10 MG/ML
INJECTION, EMULSION INTRAVENOUS
Status: DISCONTINUED | OUTPATIENT
Start: 2024-11-04 | End: 2024-11-04 | Stop reason: SDUPTHER

## 2024-11-04 RX ORDER — SUCCINYLCHOLINE/SOD CL,ISO/PF 200MG/10ML
SYRINGE (ML) INTRAVENOUS
Status: DISCONTINUED | OUTPATIENT
Start: 2024-11-04 | End: 2024-11-04 | Stop reason: SDUPTHER

## 2024-11-04 RX ORDER — MIDAZOLAM HYDROCHLORIDE 1 MG/ML
INJECTION, SOLUTION INTRAMUSCULAR; INTRAVENOUS
Status: DISCONTINUED | OUTPATIENT
Start: 2024-11-04 | End: 2024-11-04 | Stop reason: SDUPTHER

## 2024-11-04 RX ORDER — ROCURONIUM BROMIDE 10 MG/ML
INJECTION, SOLUTION INTRAVENOUS
Status: DISCONTINUED | OUTPATIENT
Start: 2024-11-04 | End: 2024-11-04 | Stop reason: SDUPTHER

## 2024-11-04 RX ORDER — GLYCOPYRROLATE 0.2 MG/ML
INJECTION INTRAMUSCULAR; INTRAVENOUS
Status: DISCONTINUED | OUTPATIENT
Start: 2024-11-04 | End: 2024-11-04 | Stop reason: SDUPTHER

## 2024-11-04 RX ORDER — HYDROCODONE BITARTRATE AND ACETAMINOPHEN 7.5; 325 MG/1; MG/1
1 TABLET ORAL EVERY 6 HOURS PRN
Qty: 20 TABLET | Refills: 0 | Status: SHIPPED | OUTPATIENT
Start: 2024-11-04 | End: 2024-11-04 | Stop reason: HOSPADM

## 2024-11-04 RX ORDER — SODIUM CHLORIDE 0.9 % (FLUSH) 0.9 %
5-40 SYRINGE (ML) INJECTION EVERY 12 HOURS SCHEDULED
Status: DISCONTINUED | OUTPATIENT
Start: 2024-11-04 | End: 2024-11-04 | Stop reason: HOSPADM

## 2024-11-04 RX ORDER — LIDOCAINE HCL/PF 100 MG/5ML
SYRINGE (ML) INJECTION
Status: DISCONTINUED | OUTPATIENT
Start: 2024-11-04 | End: 2024-11-04 | Stop reason: SDUPTHER

## 2024-11-04 RX ORDER — SODIUM CHLORIDE 0.9 % (FLUSH) 0.9 %
5-40 SYRINGE (ML) INJECTION PRN
Status: DISCONTINUED | OUTPATIENT
Start: 2024-11-04 | End: 2024-11-04 | Stop reason: HOSPADM

## 2024-11-04 RX ORDER — ONDANSETRON 2 MG/ML
INJECTION INTRAMUSCULAR; INTRAVENOUS
Status: DISCONTINUED | OUTPATIENT
Start: 2024-11-04 | End: 2024-11-04 | Stop reason: SDUPTHER

## 2024-11-04 RX ORDER — SODIUM CHLORIDE 9 MG/ML
INJECTION, SOLUTION INTRAVENOUS PRN
Status: DISCONTINUED | OUTPATIENT
Start: 2024-11-04 | End: 2024-11-04 | Stop reason: HOSPADM

## 2024-11-04 RX ORDER — FENTANYL CITRATE 50 UG/ML
INJECTION, SOLUTION INTRAMUSCULAR; INTRAVENOUS
Status: DISCONTINUED | OUTPATIENT
Start: 2024-11-04 | End: 2024-11-04 | Stop reason: SDUPTHER

## 2024-11-04 RX ADMIN — Medication 100 MG: at 09:38

## 2024-11-04 RX ADMIN — ONDANSETRON 4 MG: 2 INJECTION INTRAMUSCULAR; INTRAVENOUS at 11:09

## 2024-11-04 RX ADMIN — GLYCOPYRROLATE 0.2 MG: 0.2 INJECTION INTRAMUSCULAR; INTRAVENOUS at 10:03

## 2024-11-04 RX ADMIN — MIDAZOLAM 2 MG: 1 INJECTION INTRAMUSCULAR; INTRAVENOUS at 09:30

## 2024-11-04 RX ADMIN — ROCURONIUM BROMIDE 20 MG: 10 INJECTION INTRAVENOUS at 09:58

## 2024-11-04 RX ADMIN — ROCURONIUM BROMIDE 20 MG: 10 INJECTION INTRAVENOUS at 10:58

## 2024-11-04 RX ADMIN — FENTANYL CITRATE 50 MCG: 50 INJECTION, SOLUTION INTRAMUSCULAR; INTRAVENOUS at 09:33

## 2024-11-04 RX ADMIN — FENTANYL CITRATE 50 MCG: 50 INJECTION, SOLUTION INTRAMUSCULAR; INTRAVENOUS at 09:59

## 2024-11-04 RX ADMIN — SODIUM CHLORIDE: 9 INJECTION, SOLUTION INTRAVENOUS at 07:33

## 2024-11-04 RX ADMIN — Medication 160 MG: at 09:38

## 2024-11-04 RX ADMIN — SUGAMMADEX 200 MG: 100 INJECTION, SOLUTION INTRAVENOUS at 11:14

## 2024-11-04 RX ADMIN — DEXAMETHASONE SODIUM PHOSPHATE 8 MG: 10 INJECTION, EMULSION INTRAMUSCULAR; INTRAVENOUS at 11:09

## 2024-11-04 RX ADMIN — ESMOLOL HYDROCHLORIDE 10 MG: 10 INJECTION, SOLUTION INTRAVENOUS at 11:03

## 2024-11-04 RX ADMIN — ROCURONIUM BROMIDE 10 MG: 10 INJECTION INTRAVENOUS at 10:48

## 2024-11-04 RX ADMIN — PROPOFOL 100 MG: 10 INJECTION, EMULSION INTRAVENOUS at 10:59

## 2024-11-04 RX ADMIN — ROCURONIUM BROMIDE 40 MG: 10 INJECTION INTRAVENOUS at 09:38

## 2024-11-04 RX ADMIN — ROCURONIUM BROMIDE 10 MG: 10 INJECTION INTRAVENOUS at 09:33

## 2024-11-04 RX ADMIN — PROPOFOL 40 MG: 10 INJECTION, EMULSION INTRAVENOUS at 10:50

## 2024-11-04 RX ADMIN — PROPOFOL 240 MG: 10 INJECTION, EMULSION INTRAVENOUS at 09:38

## 2024-11-04 ASSESSMENT — PAIN SCALES - GENERAL
PAINLEVEL_OUTOF10: 4
PAINLEVEL_OUTOF10: 0
PAINLEVEL_OUTOF10: 2
PAINLEVEL_OUTOF10: 3
PAINLEVEL_OUTOF10: 3

## 2024-11-04 ASSESSMENT — PAIN DESCRIPTION - FREQUENCY: FREQUENCY: INTERMITTENT

## 2024-11-04 ASSESSMENT — PAIN SCALES - WONG BAKER
WONGBAKER_NUMERICALRESPONSE: NO HURT

## 2024-11-04 ASSESSMENT — PAIN DESCRIPTION - ONSET: ONSET: ON-GOING

## 2024-11-04 ASSESSMENT — PAIN - FUNCTIONAL ASSESSMENT: PAIN_FUNCTIONAL_ASSESSMENT: NONE - DENIES PAIN

## 2024-11-04 ASSESSMENT — PAIN DESCRIPTION - ORIENTATION: ORIENTATION: INNER

## 2024-11-04 ASSESSMENT — PAIN DESCRIPTION - LOCATION: LOCATION: THROAT

## 2024-11-04 ASSESSMENT — PAIN DESCRIPTION - PAIN TYPE: TYPE: SURGICAL PAIN

## 2024-11-04 ASSESSMENT — PAIN DESCRIPTION - DESCRIPTORS: DESCRIPTORS: SORE

## 2024-11-04 NOTE — PROGRESS NOTES
Patient oriented to Same Day department and admitted to Same Day Surgery room 5.   Patient verbalized approval for first name, last initial with physician name on unit whiteboard.     Plan of care reviewed with patient.   Patient room whiteboard filled out and discussed with patient and responsible adult.       Call light in reach.   Bed in lowest position, locked, with one bed rail up.   SCDs and warming blanket in place.  Appropriate arm bands on patient.   Bathroom offered.   All questions and concerns of patient addressed.        Meds to Beds:   Patient informed of [facility]s [facility] Beds program during admission. Patient has declined use of program.

## 2024-11-04 NOTE — BRIEF OP NOTE
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     * Globus sensation [R09.A2]     * Hoarseness [R49.0]     * Current smoker [F17.200]     * Gastroesophageal reflux disease [K21.9]     * Abnormal findings on laryngoscopy [R09.89]    Post-Op Diagnosis: Same       Procedure(s):  LARYNGOSCOPY WITH BX    Surgeon(s):  Mark Cardoso MD    Assistant:  * No surgical staff found *    Anesthesia: General    Estimated Blood Loss (mL): less than 50     Complications: None    Specimens:   ID Type Source Tests Collected by Time Destination   A : Mass of Left Anterior True Vocal Cord Tissue Vocal Cord SURGICAL PATHOLOGY Mark Cardoso MD 11/4/2024 1001    B : Subglottic Anterior left of the midline Tissue Vocal Cord SURGICAL PATHOLOGY Mark Cardoso MD 11/4/2024 1103        Implants:  * No implants in log *      Drains: * No LDAs found *    Findings: Pedunculated mass originating on the left anterior true vocal cord extending up to the anterior commissure but not across.  There was a small amount of extension of the attachment on the undersurface of the anterior cord, perhaps 4 mm, and that tissue was sent as the second specimen.  The mass had squamous epithelium on most of the surface but furthest away from the pedicle there was a break in the mucosa with whitish-gray soft tissue protruding.  See photos.    Electronically signed by MARK CARDOSO MD on 11/4/2024 at 11:29 AM

## 2024-11-04 NOTE — INTERVAL H&P NOTE
Pt Name: Gus Ratliff  MRN: [id number]  YOB: 1995  Date of evaluation: 11/4/2024    I have examined the patient and reviewed the H&P/Consult and there are no changes to the patient or plans.         Electronically signed by GALILEO CARDOSO MD on 11/4/2024 at 9:16 AM

## 2024-11-04 NOTE — DISCHARGE INSTRUCTIONS
Rest voice. May whisper    Do not clear throat    Resume normal activity and diet as tolerated.  Avoid acid salty or spicy foods for a couple of weeks.

## 2024-11-04 NOTE — ANESTHESIA PRE PROCEDURE
Department of Anesthesiology  Preprocedure Note       Name:  Gus Ratliff   Age:  29 y.o.  :  1995                                          MRN:  [id number]         Date:  2024      Surgeon: Surgeon(s):  Mark Saucedo MD    Procedure: Procedure(s):  LARYNGOSCOPY WITH BX    Medications prior to admission:   Prior to Admission medications    Medication Sig Start Date End Date Taking? Authorizing Provider   omeprazole (PRILOSEC) 10 MG delayed release capsule Take 1 capsule by mouth daily   Yes ProviderAnaya MD   famotidine (PEPCID) 20 MG tablet Take 1 tablet by mouth nightly as needed   Yes ProviderAnaya MD       Current medications:    Current Facility-Administered Medications   Medication Dose Route Frequency Provider Last Rate Last Admin   • sodium chloride flush 0.9 % injection 5-40 mL  5-40 mL IntraVENous 2 times per day Mark Saucedo MD       • sodium chloride flush 0.9 % injection 5-40 mL  5-40 mL IntraVENous PRN Mark Saucedo MD       • 0.9 % sodium chloride infusion   IntraVENous PRN Mark Saucedo MD 25 mL/hr at 24 0733 New Bag at 24 0733       Allergies:  No Known Allergies    Problem List:    Patient Active Problem List   Diagnosis Code   • Mixed obsessional thoughts and acts F42.2   • Left lateral epicondylitis M77.12   • Globus sensation R09.A2   • Hoarseness R49.0   • Current smoker F17.200   • Gastroesophageal reflux disease K21.9   • Abnormal findings on laryngoscopy R09.89   • 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 No pertinent past medical history.    Past Surgical History:        Procedure Laterality Date   • TOOTH EXTRACTION         Social History:    Social History     Tobacco Use   • Smoking status: Every Day     Current packs/day: 0.25     Average packs/day: 1 pack/day for 15.2 years (14.9 ttl pk-yrs)     Types: Cigarettes     Start date: 2009   • Smokeless tobacco: Never   •

## 2024-11-04 NOTE — ANESTHESIA POSTPROCEDURE EVALUATION
Department of Anesthesiology  Postprocedure Note    Patient: Gus Ratliff  MRN: [id number]  YOB: 1995  Date of evaluation: 11/4/2024    Procedure Summary       Date: 11/04/24 Room / Location: [facility] OR  / [facility] OR    Anesthesia Start: 0928 Anesthesia Stop: 1126    Procedure: DIRECT MICRO LARYNGOSCOPY WITH BX (Throat) Diagnosis:       Globus sensation      Hoarseness      Current smoker      Gastroesophageal reflux disease      Abnormal findings on laryngoscopy      (Globus sensation [R09.A2])      (Hoarseness [R49.0])      (Current smoker [F17.200])      (Gastroesophageal reflux disease [K21.9])      (Abnormal findings on laryngoscopy [R09.89])    Surgeons: Mark Saucedo MD Responsible Provider: Avni Gamble MD    Anesthesia Type: general ASA Status: 3            Anesthesia Type: No value filed.    Adalgisa Phase I: Adalgisa Score: 9    Adalgisa Phase II: Adalgisa Score: 10    Anesthesia Post Evaluation    Patient location during evaluation: PACU  Patient participation: complete - patient participated  Level of consciousness: awake and alert  Airway patency: patent  Nausea & Vomiting: no nausea  Cardiovascular status: blood pressure returned to baseline and hemodynamically stable  Respiratory status: acceptable and spontaneous ventilation  Hydration status: euvolemic  Pain management: adequate    No notable events documented.

## 2024-11-04 NOTE — PROGRESS NOTES
1123 Patient arrived to PACU. Patient arouses to voice and follows commands. Inner throat incision CDI. No drainage present. Patient denies pain upon admission. Respirations even and unlabored. VSS.    1130 Patient resting in bed with eyes closed, but arouses to voice. Patient states pain 3/10 but tolerable. Patient not medicated at this time. Patient tolerating ice chips well. Respirations even and unlabored. VSS.    1140 Patient resting in bed with eyes closed, but arouses to voice. Respirations even and unlabored. VSS.    1153 Patient meets criteria to discharge from PACU at this time. Patient transported to [facility] in stable condition with all belongings.

## 2024-11-04 NOTE — H&P
Neck:  Normal range of motion. Neck supple.  No thyromegaly present. No cervical lymphadenopathy or neck masses/lesion noted with palpation. Parotid and submandibular glands normal and symmetric with palpation.  Cardiovascular:  Normal rate.   Pulmonary/Chest:  Effort normal. No stridor or stertor. No respiratory distress.   Musculoskeletal:  Normal range of motion. No edema or lymphadenopathy.  Neurological:  Alert and answers questions appropriately, cooperative with exam.   Cranial nerve II-XII grossly intact.  Skin:  Skin is warm. No erythema.   Psychiatric:  Normal mood and affect. Behavior is normal.     Procedure note:  DATE AND TIME OF PROCEDURE: 10/8/2024 1:06 PM  PATIENT NAME: Gus Ratliff  MRN [id number]  SURGEON: DEMARIO Hines - CNP   PREOPERATIVE DIAGNOSIS:  Globus sensation, hoarseness of voice, current smoker, GERD  POSTOPERATIVE DIAGNOSIS:   Vocal cord mass  TEACHING: Procedure, benefits, and risks were explained to family. The risks of flexible laryngoscopy include but are not limited to: epistaxis, minor pain/discomfort, continued symptoms and need for further treatment or surgery.  Benefits include direct visualization of larynx and assessment of vocal cords mobility. Verbal informed consent was obtained.    TIME OUT: A time out was conducted immediately before starting the procedure that confirmed a final verification of the correct patient, correct procedure, correct patient position, correct site and availability of special equipment.    DESCRIPTION OF PROCEDURE:  PROCEDURE: Flexible Laryngoscopy     ANESTHESIA:  NONE  COMPLICATIONS: NONE  EBL: NONE    PROCEDURE: After allowing time for topical oxymetazoline to take affect, the scope was introduced into the left nasal passageway.  The inferior and middle turbinates appeared healthy, the middle meatus is free of masses, lesions or polyps.  No purulence.  The nasal passageway is patent.  The nasal passageway is patent.  Septum is deviated.

## 2024-11-05 NOTE — OP NOTE
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     * Globus sensation [R09.A2]     * Hoarseness [R49.0]     * Current smoker [F17.200]     * Gastroesophageal reflux disease [K21.9]     * Abnormal findings on laryngoscopy [R09.89]     Post-Op Diagnosis: Same       Procedure(s):  MICROLARYNGOSCOPY WITH BX     Surgeon(s):  Mark Saucedo MD     Assistant:  * No surgical staff found *     Anesthesia: General     Estimated Blood Loss (mL): less than 50      Complications: None     Specimens:   ID Type Source Tests Collected by Time Destination   A : Mass of Left Anterior True Vocal Cord Tissue Vocal Cord SURGICAL PATHOLOGY Mark Saucedo MD 11/4/2024 1001     B : Subglottic Anterior left of the midline Tissue Vocal Cord SURGICAL PATHOLOGY Mark Saucedo MD 11/4/2024 1103           Implants:  * No implants in log *      Drains: * No LDAs found *     Findings: Pedunculated mass originating on the left anterior true vocal cord extending up to the anterior commissure but not across.  There was a small amount of extension of the attachment on the undersurface of the anterior cord, perhaps 4 mm, and that tissue was sent as the second specimen.  The mass had squamous epithelium on most of the surface but furthest away from the pedicle there was a break in the mucosa with whitish-gray soft tissue protruding.  See photos.    Detailed Description of Procedure:   After an adequate level of general endotracheal anesthesia had been obtained with a #6 MLT endotracheal tube, table was rotated 90 degrees and the patient draped in the usual manner for laryngoscopy.  Upper alveolar ridge/teeth were protected with thermoplastic molded tooth guard and a Alissa laryngoscope was introduced into the hypopharynx.  I was unable to adequately access the anterior commissure with

## 2024-11-06 ENCOUNTER — TELEPHONE (OUTPATIENT)
Dept: ENT CLINIC | Age: 29
End: 2024-11-06

## 2024-11-07 ENCOUNTER — OFFICE VISIT (OUTPATIENT)
Dept: ENT CLINIC | Age: 29
End: 2024-11-07
Payer: COMMERCIAL

## 2024-11-07 VITALS
DIASTOLIC BLOOD PRESSURE: 86 MMHG | BODY MASS INDEX: 40.43 KG/M2 | SYSTOLIC BLOOD PRESSURE: 144 MMHG | TEMPERATURE: 97.5 F | RESPIRATION RATE: 20 BRPM | HEART RATE: 84 BPM | OXYGEN SATURATION: 96 % | HEIGHT: 74 IN | WEIGHT: 315 LBS

## 2024-11-07 DIAGNOSIS — K21.9 LARYNGOPHARYNGEAL REFLUX: ICD-10-CM

## 2024-11-07 DIAGNOSIS — Z98.890 S/P EXCISION OF VOCAL CORD NODULE: ICD-10-CM

## 2024-11-07 DIAGNOSIS — R49.0 HOARSENESS: Primary | ICD-10-CM

## 2024-11-07 DIAGNOSIS — K21.9 GASTROESOPHAGEAL REFLUX DISEASE, UNSPECIFIED WHETHER ESOPHAGITIS PRESENT: ICD-10-CM

## 2024-11-07 PROCEDURE — 99212 OFFICE O/P EST SF 10 MIN: CPT | Performed by: OTOLARYNGOLOGY

## 2024-11-07 NOTE — PROGRESS NOTES
[facility] [place] [facility]  [facility]AR, NOSE [facility] THROAT  [address]  [address]  [facility] [address]  Dept: [phone]  Dept Fax: [phone]  Loc: [phone]    Gus Ratliff is a 29 y.o. male who was referred by No ref. provider found for:  Chief Complaint   Patient presents with    Post-Op Check     Patient is here post op 11/04 laryngoscopy with biopsy results. Patient said that the voice is still very soft and it is not loud no pain. Patient doesn't feel any strain in the neck area.    .    HPI:     Gus Ratliff is a 29 y.o. male who presents today for follow-up on the biopsy of the mass on his vocal cord.  That was a benign inflamed vocal nodule with no evidence of malignancy.  He has problems with reflux even upright..    History:     No Known Allergies  Current Outpatient Medications   Medication Sig Dispense Refill    omeprazole (PRILOSEC) 10 MG delayed release capsule Take 1 capsule by mouth daily      famotidine (PEPCID) 20 MG tablet Take 1 tablet by mouth nightly as needed      HYDROcodone-acetaminophen (NORCO) 7.5-325 MG per tablet Take 1 tablet by mouth every 6 hours as needed for Pain for up to 5 days. Max Daily Amount: 4 tablets (Patient not taking: Reported on 11/7/2024) 20 tablet 0     No current facility-administered medications for this visit.     History reviewed. No pertinent past medical history.   Past Surgical History:   Procedure Laterality Date    LARYNGOSCOPY N/A 11/4/2024    DIRECT MICRO LARYNGOSCOPY WITH BX performed by Mark Saucedo MD at [facility] OR    TOOTH EXTRACTION       Family History   Problem Relation Age of Onset    Kidney Disease Father     Mental Illness Mother     Cancer Maternal Grandmother     High Blood Pressure Maternal Grandmother     Cancer Maternal Grandfather     Asthma Neg Hx     Diabetes Neg Hx     Heart Disease Neg Hx     Stroke Neg Hx      Social History     Tobacco Use    Smoking status: Former     Average packs/day: 1 pack/day

## 2024-11-07 NOTE — PATIENT INSTRUCTIONS
Do not fill up stomach for 3 hours before bedtime. Elevate the head of the bed, perhaps with a foam wedge.  Continue Pepcid twice daily.  Do not clear your throat.    Absolutely no smoking    Referral for GI consultation placed

## 2024-12-09 ENCOUNTER — TELEPHONE (OUTPATIENT)
Dept: FAMILY MEDICINE CLINIC | Age: 29
End: 2024-12-09

## 2024-12-09 NOTE — TELEPHONE ENCOUNTER
Pts mother is a current patient of [facility] (Andressa Lalit) and she asked if [facility] would accept Gus as a new patient and she is agreeable.     Pt will call to schedule his own appointment.

## (undated) DEVICE — PAD,NON-ADHERENT,3X8,STERILE,LF,1/PK: Brand: MEDLINE

## (undated) DEVICE — TUBING, SUCTION, 1/4" X 20', STRAIGHT: Brand: MEDLINE INDUSTRIES, INC.

## (undated) DEVICE — CONTAINER,SPECIMEN,PNEU TUBE,4OZ,OR STRL: Brand: MEDLINE

## (undated) DEVICE — HYPODERMIC SAFETY NEEDLE: Brand: MAGELLAN

## (undated) DEVICE — GLOVE ORANGE PI 7 1/2   MSG9075

## (undated) DEVICE — LARYNGOSCOPY - BRONCHOSCOPY: Brand: MEDLINE INDUSTRIES, INC.

## (undated) DEVICE — PACK-MAJOR

## (undated) DEVICE — UNIVERSAL MONOPOLAR LAPAROSCOPIC CABLE 10FT, 4MM PIN CONNECTOR: Brand: CONMED